# Patient Record
Sex: MALE | Race: WHITE | Employment: PART TIME | URBAN - METROPOLITAN AREA
[De-identification: names, ages, dates, MRNs, and addresses within clinical notes are randomized per-mention and may not be internally consistent; named-entity substitution may affect disease eponyms.]

---

## 2019-04-30 ENCOUNTER — HOSPITAL ENCOUNTER (OUTPATIENT)
Dept: GENERAL RADIOLOGY | Age: 64
Discharge: HOME | End: 2019-04-30
Payer: MEDICARE

## 2019-04-30 DIAGNOSIS — M54.9 BACK PAIN WITH RADIATION: ICD-10-CM

## 2019-04-30 DIAGNOSIS — M79.10 MYALGIA: ICD-10-CM

## 2019-04-30 PROCEDURE — 72110 X-RAY EXAM L-2 SPINE 4/>VWS: CPT

## 2019-04-30 PROCEDURE — 72100 X-RAY EXAM L-S SPINE 2/3 VWS: CPT

## 2019-05-09 ENCOUNTER — HOSPITAL ENCOUNTER (OUTPATIENT)
Dept: GENERAL RADIOLOGY | Age: 64
Discharge: HOME | End: 2019-05-09
Attending: INTERNAL MEDICINE
Payer: MEDICARE
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
## 2019-05-09 DIAGNOSIS — M54.2 NECK PAIN: ICD-10-CM

## 2019-05-09 PROCEDURE — 72050 X-RAY EXAM NECK SPINE 4/5VWS: CPT

## 2019-09-17 ENCOUNTER — HOSPITAL ENCOUNTER (OUTPATIENT)
Dept: MRI IMAGING | Age: 64
Discharge: HOME | End: 2019-09-17
Attending: ORTHOPAEDIC SURGERY
Payer: MEDICARE

## 2019-09-17 DIAGNOSIS — M54.9 BACK PAIN: ICD-10-CM

## 2019-09-17 PROCEDURE — 72148 MRI LUMBAR SPINE W/O DYE: CPT

## 2020-11-11 ENCOUNTER — HOSPITAL ENCOUNTER (EMERGENCY)
Age: 65
Discharge: STILL ADMITTED OR ADMINISTRATIVE | End: 2020-11-11

## 2020-11-11 ENCOUNTER — TRANSCRIBE ORDER (OUTPATIENT)
Dept: SCHEDULING | Age: 65
End: 2020-11-11

## 2020-11-11 DIAGNOSIS — M89.8X6 TIBIAL PAIN: Primary | ICD-10-CM

## 2020-11-12 ENCOUNTER — HOSPITAL ENCOUNTER (OUTPATIENT)
Dept: MRI IMAGING | Age: 65
Discharge: HOME | End: 2020-11-12
Attending: STUDENT IN AN ORGANIZED HEALTH CARE EDUCATION/TRAINING PROGRAM
Payer: MEDICARE

## 2020-11-12 ENCOUNTER — TRANSCRIBE ORDER (OUTPATIENT)
Dept: SCHEDULING | Age: 65
End: 2020-11-12

## 2020-11-12 DIAGNOSIS — M89.8X6 PAIN IN TIBIA: ICD-10-CM

## 2020-11-12 DIAGNOSIS — M89.8X6 PAIN IN TIBIA: Primary | ICD-10-CM

## 2020-11-12 PROCEDURE — 73718 MRI LOWER EXTREMITY W/O DYE: CPT

## 2021-10-13 ENCOUNTER — TRANSCRIBE ORDER (OUTPATIENT)
Dept: SCHEDULING | Age: 66
End: 2021-10-13

## 2021-10-13 DIAGNOSIS — M51.36 DEGENERATION OF LUMBAR INTERVERTEBRAL DISC: Primary | ICD-10-CM

## 2021-10-13 DIAGNOSIS — M54.16 LUMBAR RADICULOPATHY: ICD-10-CM

## 2021-11-02 ENCOUNTER — HOSPITAL ENCOUNTER (OUTPATIENT)
Dept: MRI IMAGING | Age: 66
Discharge: HOME | End: 2021-11-02
Attending: ORTHOPAEDIC SURGERY
Payer: MEDICARE

## 2021-11-02 DIAGNOSIS — M54.16 LUMBAR RADICULOPATHY: ICD-10-CM

## 2021-11-02 DIAGNOSIS — M51.36 DEGENERATION OF LUMBAR INTERVERTEBRAL DISC: ICD-10-CM

## 2021-11-02 PROCEDURE — 72148 MRI LUMBAR SPINE W/O DYE: CPT

## 2021-11-24 ENCOUNTER — TELEPHONE (OUTPATIENT)
Dept: NEUROLOGY | Age: 66
End: 2021-11-24

## 2021-11-24 ENCOUNTER — OFFICE VISIT (OUTPATIENT)
Dept: NEUROLOGY | Age: 66
End: 2021-11-24
Payer: MEDICARE

## 2021-11-24 VITALS
HEART RATE: 92 BPM | WEIGHT: 194.5 LBS | OXYGEN SATURATION: 97 % | BODY MASS INDEX: 28.81 KG/M2 | SYSTOLIC BLOOD PRESSURE: 132 MMHG | DIASTOLIC BLOOD PRESSURE: 86 MMHG | HEIGHT: 69 IN

## 2021-11-24 DIAGNOSIS — R41.3 MEMORY IMPAIRMENT: Primary | ICD-10-CM

## 2021-11-24 DIAGNOSIS — G30.9 ALZHEIMER'S DISEASE, UNSPECIFIED (CODE) (HCC): ICD-10-CM

## 2021-11-24 PROCEDURE — G8536 NO DOC ELDER MAL SCRN: HCPCS | Performed by: PSYCHIATRY & NEUROLOGY

## 2021-11-24 PROCEDURE — 1101F PT FALLS ASSESS-DOCD LE1/YR: CPT | Performed by: PSYCHIATRY & NEUROLOGY

## 2021-11-24 PROCEDURE — G8754 DIAS BP LESS 90: HCPCS | Performed by: PSYCHIATRY & NEUROLOGY

## 2021-11-24 PROCEDURE — G8752 SYS BP LESS 140: HCPCS | Performed by: PSYCHIATRY & NEUROLOGY

## 2021-11-24 PROCEDURE — G8419 CALC BMI OUT NRM PARAM NOF/U: HCPCS | Performed by: PSYCHIATRY & NEUROLOGY

## 2021-11-24 PROCEDURE — 3017F COLORECTAL CA SCREEN DOC REV: CPT | Performed by: PSYCHIATRY & NEUROLOGY

## 2021-11-24 PROCEDURE — 99204 OFFICE O/P NEW MOD 45 MIN: CPT | Performed by: PSYCHIATRY & NEUROLOGY

## 2021-11-24 PROCEDURE — G9717 DOC PT DX DEP/BP F/U NT REQ: HCPCS | Performed by: PSYCHIATRY & NEUROLOGY

## 2021-11-24 PROCEDURE — G8427 DOCREV CUR MEDS BY ELIG CLIN: HCPCS | Performed by: PSYCHIATRY & NEUROLOGY

## 2021-11-24 RX ORDER — LYSINE HCL 500 MG
1000 TABLET ORAL
COMMUNITY

## 2021-11-24 RX ORDER — LANOLIN ALCOHOL/MO/W.PET/CERES
1000 CREAM (GRAM) TOPICAL
COMMUNITY

## 2021-11-24 RX ORDER — METHOCARBAMOL 750 MG/1
750 TABLET, FILM COATED ORAL
COMMUNITY

## 2021-11-24 RX ORDER — AMLODIPINE BESYLATE 2.5 MG/1
5 TABLET ORAL
COMMUNITY
End: 2021-12-07

## 2021-11-24 RX ORDER — DONEPEZIL HYDROCHLORIDE 10 MG/1
10 TABLET, FILM COATED ORAL
Qty: 60 TABLET | Refills: 3 | Status: SHIPPED | OUTPATIENT
Start: 2021-11-24 | End: 2021-12-24

## 2021-11-24 RX ORDER — GABAPENTIN 100 MG/1
CAPSULE ORAL
COMMUNITY
Start: 2021-11-17

## 2021-11-24 RX ORDER — DIAZEPAM 5 MG/1
TABLET ORAL
COMMUNITY
Start: 2021-10-22 | End: 2021-11-24

## 2021-11-24 RX ORDER — HYDROMORPHONE HYDROCHLORIDE 2 MG/1
2 TABLET ORAL
COMMUNITY
End: 2021-12-07

## 2021-11-24 RX ORDER — MEMANTINE HYDROCHLORIDE 5 MG/1
5 TABLET ORAL 2 TIMES DAILY
Qty: 60 TABLET | Refills: 3 | Status: SHIPPED | OUTPATIENT
Start: 2021-11-24 | End: 2022-03-23

## 2021-11-24 RX ORDER — DONEPEZIL HYDROCHLORIDE 5 MG/1
TABLET, FILM COATED ORAL
COMMUNITY
Start: 2021-11-15 | End: 2021-11-24 | Stop reason: SDUPTHER

## 2021-11-24 RX ORDER — ALBUTEROL SULFATE 90 UG/1
AEROSOL, METERED RESPIRATORY (INHALATION)
COMMUNITY
Start: 2021-07-01

## 2021-11-24 NOTE — PROGRESS NOTES
Chief Complaint   Patient presents with    Neurologic Problem     \" I have brain atrophy and I have been having memory loss that is getting worse. \"     Visit Vitals  /86 (BP 1 Location: Right upper arm, BP Patient Position: Sitting)   Pulse 92   Ht 5' 9\" (1.753 m)   Wt 194 lb 8 oz (88.2 kg)   SpO2 97%   BMI 28.72 kg/m²

## 2021-11-24 NOTE — TELEPHONE ENCOUNTER
----- Message from April Rabb sent at 11/24/2021  2:22 PM EST -----  Regarding: /Telephone  General Message/Vendor Calls    Caller's first and last name: N/A      Reason for call: Questions      Callback required yes/no and why: Yes      Best contact number(s): [phone]      Details to clarify the request: Pt has some questions from his appt       April Rabb

## 2021-11-24 NOTE — PROGRESS NOTES
[facility]   NEW PATIENT EVALUATION/CONSULTATION       PATIENT NAME: Chris Parham    MRN: [id number]    REASON FOR CONSULTATION: Progressive memory impairment, abnormal head CT    11/24/21    HISTORY OF PRESENT ILLNESS:  Chris Parham is a 74y.o.-year-old right-hand-dominant male who presents to [facility] as referral from primary care for progressive memory impairment. Patient is a poor historian though mentions that over the past several years memory has declined. States that his short-term memory is garbage is able to remember more long-term events. Memory impairment is manifest by not be able to remember events, conversations. Is reported going to the wrong office for various appointments. Does use tools such as writing things down to help his memory. Does not endorse much in the way of visual spatial difficulties no motoric changes, does not endorse hallucinations. Does not endorse any other neurologic complaints. States that he is to tell his previous primary care about this which was ignored. More recently he was seen at home for a wellness exam at which point he was noted to have concern for MCI/dementia which he told his primary care about. Primary placed him on donezepil as well as ordered CT which demonstrated mild diffuse atrophy. He does not note any tremor or anything else of note regarding his memory complaint. 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 Says that he eats well does exercise 3 times a week does note that sleep is impaired. He also wonders about intrusive thoughts which appear mostly related to sexual fantasies. PAST MEDICAL HISTORY:  Past Medical History:   Diagnosis Date    Allergic rhinitis     RENEE positive 2004    1:160 speckled.     Angioedema of lips     childhood -unclear trigger     Asthma     CTS (carpal tunnel syndrome)     CTS (carpal tunnel syndrome) 2004    emg, dr. Mckayla Nieves'    Degenerative cervical disc     Depression     DeQuervain's disease (tenosynovitis)     left    Fibromyalgia     Fibromyalgia     Insomnia 2/15/2013    Post herpetic neuralgia     Rotator cuff tendinitis     left    Seborrheic dermatitis     Thoracic outlet syndrome     Thromboembolus ([address]     right arm        PAST SURGICAL HISTORY:  Past Surgical History:   Procedure Laterality Date    HX HERNIA REPAIR  2010    right    HX OTHER SURGICAL      first right rib resection       FAMILY HISTORY:   Family History   Problem Relation Age of Onset    Diabetes Mother     Arthritis-osteo Mother     Hypertension Mother     Heart Disease Mother         MI    Hypertension Father     Heart Disease Father         MI    Hypertension Sister     Heart Disease Sister     Hypertension Brother          SOCIAL HISTORY:  Social History     Socioeconomic History    Marital status:    Tobacco Use    Smoking status: Former Smoker    Smokeless tobacco: Never Used   Vaping Use    Vaping Use: Never used   Substance and Sexual Activity    Alcohol use: Yes     Comment: rare     Drug use: No         MEDICATIONS:   Current Outpatient Medications   Medication Sig Dispense Refill    methocarbamoL (ROBAXIN) 750 mg tablet 750 mg.      gabapentin (NEURONTIN) 100 mg capsule       cyanocobalamin 1,000 mcg tablet 1,000 mcg.  Calcium Carbonate-Vit D3-Min 600 mg calcium- 400 unit tab 1,000 mg.      amLODIPine (NORVASC) 2.5 mg tablet 5 mg.  albuterol (Ventolin HFA) 90 mcg/actuation inhaler       donepeziL (ARICEPT) 10 mg tablet Take 1 Tablet by mouth nightly for 30 days. 60 Tablet 3    memantine (NAMENDA) 5 mg tablet Take 1 Tablet by mouth two (2) times a day for 30 days.  60 Tablet 3    meloxicam (MOBIC) 7.5 mg tablet TAKE TWO TABLETS BY MOUTH DAILY 180 Tablet 0    irbesartan (AVAPRO) 150 mg tablet TAKE ONE TABLET BY MOUTH DAILY 90 Tablet 0    montelukast (SINGULAIR) 10 mg tablet TAKE ONE TABLET BY MOUTH DAILY 90 Tablet 2    traZODone (DESYREL) 50 mg tablet TAKE TWO TABLETS BY MOUTH EVERY NIGHT AT BEDTIME 60 Tab 5    LORazepam (ATIVAN) 0.5 mg tablet TAKE 1 TABLET BY MOUTH EVERY DAY AS NEEDED FOR ANXITY  Indications: anxious 10 Tab 0    naloxone (NARCAN) 4 mg/actuation nasal spray Use 1 spray intranasally, then discard. Repeat with new spray every 2 min as needed for opioid overdose symptoms, alternating nostrils. 1 Each 5    OTHER Fluroracil ceam 5% bid to face. Also Sildenafil 20 mg , up to 5 tabs prior to activity .  levalbuterol tartrate (XOPENEX) 45 mcg/actuation inhaler INHALE 2 PUFFS EVERY 4 TO 6 HOURS AS NEEDED FOR COUGH AND WHEEZE 15 Inhaler 3    OTHER Stress Tab, Calcium /D, Magnesium , b12  Daily      alfuzosin SR (UROXATRAL) 10 mg SR tablet Take  by mouth daily.  cetirizine (ZYRTEC) 10 mg tablet Take 10 mg by mouth nightly.  HYDROmorphone (DILAUDID) 2 mg tablet 2 mg. (Patient not taking: Reported on 11/24/2021)      cyclobenzaprine (FLEXERIL) 10 mg tablet Take 1 Tab by mouth three (3) times daily as needed for Muscle Spasm(s). (Patient not taking: Reported on 11/24/2021) 30 Tab 1         ALLERGIES:  Allergies   Allergen Reactions    Latex Unknown (comments)    Other Food Other (comments)     Pork -skin test + Wheat - skin test +itching. Corn -unclear reaction   Peanut - skin test +  But eats peanuts in moderation    Albuterol Other (comments)     Palpitations, insomnia . (takes Xopenex Ok )   [employer] Unknown (comments)    Hydrocodone [Hydrocodone] Nausea Only    Pcn [Penicillins] Unknown (comments)    Sulfa (Sulfonamide Antibiotics) Diarrhea         REVIEW OF SYSTEMS:  10 point ROS reviewed with patient. Please see scanned document under media.        PHYSICAL EXAM:  Vital Signs:   Visit Vitals  /86 (BP 1 Location: Right upper arm, BP Patient Position: Sitting)   Pulse 92   Ht 5' 9\" (1.753 m)   Wt 194 lb 8 oz (88.2 kg)   SpO2 97%   BMI 28.72 kg/m²     Unique dividual resting comfortably in bed appearing a bit disheveled. HEENT appears grossly unremarkable neck appears supple. Cardiovascular demonstrates constant S1/S2 regular rhythm. Pulmonary demonstrates equal entry bilaterally. Abdomen is nondistended/nontender. Extremities are warm/dry. Neurologically, patient appears oriented to self, day month and year. Oriented to situation. Attention is impaired as measured on [place]. Speech is clear, language is fluent, naming is reasonably intact to simple objects, repetition is intact. Verbal fluency is intact. Scores 19 out of 30 on [place] with deficiencies as noted below. Cranials 2 through 12 appear grossly unremarkable. Motorically patient has normal bulk and tone, full strength in upper and lower extremities. Sensation is grossly intact. Coordination is intact in upper extremities. No dysmetria is noted, no tremors noted at rest with posture or intention. Primary gait and station appears unremarkable. PERTINENT DATA:  None    CT Results (maximum last 3): No results found for this or any previous visit. MRI Results (maximum last 3): Results from [place] encounter on 11/02/21    MRI LUMB SPINE WO CONT    Narrative  EXAM: MRI LUMB SPINE WO CONT    INDICATION: . Other intervertebral disc degeneration, lumbar region    COMPARISON: MR lumbar spine September 17, 2019    TECHNIQUE: MR imaging of the lumbar spine was performed using the following  sequences: sagittal T1, T2, STIR;  axial T1, T2.    CONTRAST:  None. FINDINGS:    There is normal alignment of the lumbar spine. Vertebral body heights are  maintained. Marrow signal is normal.    The conus medullaris terminates at L1. Signal and caliber of the distal spinal  cord are within normal limits. The paraspinal soft tissues are within normal limits. Lower thoracic spine: No herniation or stenosis.     L1-L2: Broad-based disc bulge with superimposed right paracentral/foraminal disc  protrusion which causes narrowing of the right lateral recess and impingement  upon the traversing right L2 nerve root. Mild bilateral neuroforaminal  narrowing. L2-L3: Broad-based disc bulge and mild bilateral facet arthropathy cause mild  spinal canal narrowing and mild bilateral neuroforaminal narrowing. L3-L4: Broad-based disc bulge, and moderate bilateral facet arthropathy with  mild thickening of ligamentum flavum causes mild spinal canal narrowing and  moderate bilateral neuroforaminal narrowing. L4-L5: Broad-based disc bulge with superimposed central disc protrusion, with  moderate bilateral facet arthropathy, causes mild spinal canal narrowing and  severe bilateral neuroforaminal narrowing. L5-S1: Broad-based disc bulge and moderate bilateral facet arthropathy cause  severe bilateral neuroforaminal narrowing. No significant spinal canal  narrowing. Impression  1. Severe bilateral neuroforaminal narrowing at L4-5 and L5-S1. Multiple levels  of mild to moderate neuroforaminal narrowing as detailed above. 2. Mild spinal canal narrowing at L2-3, L3-4, and L4-5.  3. Right paracentral/foraminal disc protrusion at L1-2 cause narrowing of the  right lateral recess and impingement upon the traversing right L2 nerve root. Results from [place] encounter on 11/12/20    MRI TIB/FIB RT WO CONT    Narrative  EXAM:  MRI TIB/FIB RT WO CONT    INDICATION:  Evaluate for stress fracture. COMPARISON: None    TECHNIQUE: Axial, coronal, and sagittal MRI of the right tibia/fibula in the T1,  T2, and inversion-recovery pulse sequences with and without fat saturation . CONTRAST: None. FINDINGS: Bone marrow: No intramedullary edema. No evidence of a fracture line. No evidence of a focal lesion. Tendons: Intact. Muscles: Within normal limits. Neurovascular bundles: Within normal limits. Soft tissue mass: No mass.  There is a nonspecific small focus of  subcutaneous/periosteal edema anterior medial to the mid tibia, best seen on  axial series 9 and 1 image 26. .    Impression  IMPRESSION:  Nonspecific small focus of subcutaneous/periosteal edema anterior medial to the  mid tibia. No evidence of stress fracture or intramedullary bone marrow edema. Results from [place] encounter on 09/17/19    MRI LUMB SPINE WO CONT    Narrative  EXAM:  MRI LUMB SPINE WO CONT    INDICATION:   BACK PAIN    COMPARISON: Lumbar spine radiograph 4/30/2019. TECHNIQUE: Multiplanar multisequence acquisition without contrast of the lumbar  spine. The study was performed on an open configuration low field strength MR  imaging system. CONTRAST: None. FINDINGS:  The last well-formed disk is designated as L5-S1 for the purpose of this report. Vertebral bodies were numbered using this convention. There is normal alignment of the lumbar spine. Vertebral body heights are  maintained without evidence of acute fracture. Marrow signal is normal.  Multilevel degenerative disc disease as detailed below. The conus is normal in  size and signal and terminates at L1. The cauda equina is unremarkable. Visualized soft tissues are unremarkable. T12-L1: No significant disc herniation, spinal canal or neural foraminal  stenosis. L1-L2: Diffuse disc bulge with superimposed right paracentral disc protrusion,  which mildly narrows the right lateral recess. There is overall mild spinal  canal stenosis. Mild right and no left neural foraminal stenosis. L2-L3: Diffuse disc bulge. Mild spinal canal stenosis. Mild bilateral neural  foraminal stenosis. L3-L4: Diffuse disc bulge. Mild to moderate spinal canal stenosis. Mild to  moderate bilateral neural foraminal stenosis. L4-L5: Diffuse disc bulge. Mild bilateral facet arthropathy. Moderate to severe  spinal canal stenosis. Severe bilateral neural foraminal stenosis. L5-S1: Mild diffuse disc bulge. No significant spinal canal stenosis.  Severe  right and moderate left neural foraminal stenosis. Impression  IMPRESSION:  1. Moderate to severe spinal canal stenosis and severe bilateral neural  foraminal stenosis at L4-L5. 2. Severe right and moderate left neural foraminal stenosis at L5-S1.  3. Mild to moderate spinal canal stenosis at L3-L4. Remaining degenerative  changes as detailed above. ASSESSMENT:      Coco iRchardson is a 58-year-old right-hand-dominant male who presents to [facility] neurology clinic for evaluation of progressive memory decline concerning for Alzheimer's type dementia    PLAN:  Patient scored a 19 out of 30 on bedside MoCA exam with deficiencies in executive function/visuospatial, attention, registry and recall  Given patient's tangential and somewhat rambling nature unlikely that patient would reasonably able to tolerate neuropsych testing  Has already had head CT which demonstrates diffuse atrophy to a mild degree without particular pattern no need for MRI as no tumor, significant white matter burden was noted  Patient is already on donezepil 5 mg nightly will increase to 10 mg and add memantine 5 mg twice daily  Discuss cognitive/physical exercise, social engagement, proper diet and sleep as measures to further address memory decline  Encourage patient to consider having his advanced directive/living will in order  Did not offer, the patient's intrusive thoughts including sexual fantasies    Follow-up in 3 months    Greater than 45 minutes were spent present by me during this visit of which greater than face time was engaged in counseling and coordination of care.       Abdullahi Aguirre MD       CC Referring provider:  Jennyfer Valdez MD

## 2021-11-24 NOTE — TELEPHONE ENCOUNTER
----- Message from Jefry Arias sent at 11/24/2021  3:59 PM EST -----  Regarding: \telephone  Contact: [phone]  General Message/Vendor Calls    Caller's first and last name:self      Reason for call:have some question       Callback required yes/no and why:y      Best contact number(s):[phone] or [phone]      Details to clarify the request:n\a      Jefry Arias

## 2021-11-25 LAB
TSH SERPL DL<=0.005 MIU/L-ACNC: 1.61 UIU/ML (ref 0.45–4.5)
VIT B12 SERPL-MCNC: 933 PG/ML (ref 232–1245)

## 2021-11-26 NOTE — TELEPHONE ENCOUNTER
Patient calling with questions from last appt:    1) Stage of dementia that he is in and how long he has had it  2) Any tests that need to be done? 3) Will this affect his driving or working? 4) questions re coconut oil supplement? 5) can he still read because that makes him tired. Patient also stated he has let things go in the house and he is wondering if there is someone that can help him. For example he said his coats are piled up almost as tall as he is.      He also stated there is a question too personal and would rather discuss with Dr. Tony Doll

## 2021-11-30 NOTE — TELEPHONE ENCOUNTER
----- Message from Green Button sent at 11/30/2021  2:26 PM EST -----  Regarding: Dr. Rodriguez/Telephone  General Message/Vendor Calls    Caller's first and last name:      Reason for call: The patient is very upset and needs to talk to Dr. Pippa De La O.      Callback required yes/no and why: Yes The patients has questions and concerns      Best contact number(s): [phone]      Details to clarify the request: The patient is very upset and needs to talk to Dr. Tommie Meraz. Patient has questions and concerns about the meds. Hes hearing noises. Call him Please.            Green Button

## 2021-12-01 NOTE — TELEPHONE ENCOUNTER
Patient is calling stating he is having leg cramps that he thinks is because of the medication. He is requesting a call back. Patient is scheduled for a follow up on 12/7. Please call to discuss side effects      Patient said he has never had this type of neglect from a doctor before.  He doesn't understand why no one has called him back

## 2021-12-07 ENCOUNTER — OFFICE VISIT (OUTPATIENT)
Dept: NEUROLOGY | Age: 66
End: 2021-12-07
Payer: MEDICARE

## 2021-12-07 VITALS
DIASTOLIC BLOOD PRESSURE: 80 MMHG | BODY MASS INDEX: 28.09 KG/M2 | WEIGHT: 196.2 LBS | HEART RATE: 101 BPM | HEIGHT: 70 IN | OXYGEN SATURATION: 96 % | SYSTOLIC BLOOD PRESSURE: 130 MMHG

## 2021-12-07 DIAGNOSIS — R40.4 NONSPECIFIC PAROXYSMAL SPELL: Primary | ICD-10-CM

## 2021-12-07 DIAGNOSIS — G30.9 ALZHEIMER'S DISEASE, UNSPECIFIED (CODE) (HCC): ICD-10-CM

## 2021-12-07 PROCEDURE — 99215 OFFICE O/P EST HI 40 MIN: CPT | Performed by: PSYCHIATRY & NEUROLOGY

## 2021-12-07 PROCEDURE — G8536 NO DOC ELDER MAL SCRN: HCPCS | Performed by: PSYCHIATRY & NEUROLOGY

## 2021-12-07 PROCEDURE — G8754 DIAS BP LESS 90: HCPCS | Performed by: PSYCHIATRY & NEUROLOGY

## 2021-12-07 PROCEDURE — G8427 DOCREV CUR MEDS BY ELIG CLIN: HCPCS | Performed by: PSYCHIATRY & NEUROLOGY

## 2021-12-07 PROCEDURE — 1101F PT FALLS ASSESS-DOCD LE1/YR: CPT | Performed by: PSYCHIATRY & NEUROLOGY

## 2021-12-07 PROCEDURE — G8752 SYS BP LESS 140: HCPCS | Performed by: PSYCHIATRY & NEUROLOGY

## 2021-12-07 PROCEDURE — G8419 CALC BMI OUT NRM PARAM NOF/U: HCPCS | Performed by: PSYCHIATRY & NEUROLOGY

## 2021-12-07 PROCEDURE — 3017F COLORECTAL CA SCREEN DOC REV: CPT | Performed by: PSYCHIATRY & NEUROLOGY

## 2021-12-07 PROCEDURE — G9717 DOC PT DX DEP/BP F/U NT REQ: HCPCS | Performed by: PSYCHIATRY & NEUROLOGY

## 2021-12-07 NOTE — PROGRESS NOTES
Neurology Clinic Follow up Note    Patient ID:  Dean Proctor  [id number]  78 y.o.  1955      Mr. Mariam Serna is here for follow up today of  Chief Complaint   Patient presents with    Follow-up    Neurologic Problem          Last Appointment With Me:  11/24/2021       Interval History: In the interval from prior evaluation, patient has noted leg cramps in his legs since increasing donezepil otherwise no major issues are noted. Is here today to answer a myriad of questions. PMHx/ PSHx/ FHx/ SHx:  Reviewed and unchanged previous visit. ROS:  Comprehensive review of systems negative except for as noted above. Objective:       Meds:  Current Outpatient Medications   Medication Sig Dispense Refill    gabapentin (NEURONTIN) 100 mg capsule       Calcium Carbonate-Vit D3-Min 600 mg calcium- 400 unit tab 1,000 mg.      albuterol (Ventolin HFA) 90 mcg/actuation inhaler       donepeziL (ARICEPT) 10 mg tablet Take 1 Tablet by mouth nightly for 30 days. 60 Tablet 3    memantine (NAMENDA) 5 mg tablet Take 1 Tablet by mouth two (2) times a day for 30 days. 60 Tablet 3    meloxicam (MOBIC) 7.5 mg tablet TAKE TWO TABLETS BY MOUTH DAILY 180 Tablet 0    irbesartan (AVAPRO) 150 mg tablet TAKE ONE TABLET BY MOUTH DAILY 90 Tablet 0    montelukast (SINGULAIR) 10 mg tablet TAKE ONE TABLET BY MOUTH DAILY 90 Tablet 2    levalbuterol tartrate (XOPENEX) 45 mcg/actuation inhaler INHALE 2 PUFFS EVERY 4 TO 6 HOURS AS NEEDED FOR COUGH AND WHEEZE 15 Inhaler 3    OTHER Stress Tab, Calcium /D, Magnesium , b12  Daily      alfuzosin SR (UROXATRAL) 10 mg SR tablet Take  by mouth daily.  cyclobenzaprine (FLEXERIL) 10 mg tablet Take 1 Tab by mouth three (3) times daily as needed for Muscle Spasm(s). 30 Tab 1    methocarbamoL (ROBAXIN) 750 mg tablet 750 mg.      cyanocobalamin 1,000 mcg tablet 1,000 mcg.          Exam:  Visit Vitals  /80   Pulse (!) 101   Ht 5' 10\" (1.778 m)   Wt 196 lb 3.2 oz (89 kg)   SpO2 96% BMI 28.15 kg/m²     Elderly male appearing stated age appearing somewhat hyper animated and tangential in his thought processes. HEENT appears grossly intact observation. Neck appears supple to observation. Extremities are warm/dry. Neurologically patient appears oriented to self and situation, orientation to day month year not tested. Speech is clear language fluent with deficiencies in attention and smooths string of consciousness clearly absent. Cranial nerves II through XII appear grossly unremarkable. Motorically patient moves all extremities with equal strength. Remainder examination is deferred. LABS  Results for orders placed or performed in visit on 11/24/21   TSH 3RD GENERATION   Result Value Ref Range    TSH 1.610 0.450 - 4.500 uIU/mL   VITAMIN B12   Result Value Ref Range    Vitamin B12 933 232 - 1,245 pg/mL       IMAGING:  MRI Results (most recent):  Results from [place] encounter on 11/02/21    MRI LUMB SPINE WO CONT    Narrative  EXAM: MRI LUMB SPINE WO CONT    INDICATION: . Other intervertebral disc degeneration, lumbar region    COMPARISON: MR lumbar spine September 17, 2019    TECHNIQUE: MR imaging of the lumbar spine was performed using the following  sequences: sagittal T1, T2, STIR;  axial T1, T2.    CONTRAST:  None. FINDINGS:    There is normal alignment of the lumbar spine. Vertebral body heights are  maintained. Marrow signal is normal.    The conus medullaris terminates at L1. Signal and caliber of the distal spinal  cord are within normal limits. The paraspinal soft tissues are within normal limits. Lower thoracic spine: No herniation or stenosis. L1-L2: Broad-based disc bulge with superimposed right paracentral/foraminal disc  protrusion which causes narrowing of the right lateral recess and impingement  upon the traversing right L2 nerve root. Mild bilateral neuroforaminal  narrowing.     L2-L3: Broad-based disc bulge and mild bilateral facet arthropathy cause mild  spinal canal narrowing and mild bilateral neuroforaminal narrowing. L3-L4: Broad-based disc bulge, and moderate bilateral facet arthropathy with  mild thickening of ligamentum flavum causes mild spinal canal narrowing and  moderate bilateral neuroforaminal narrowing. L4-L5: Broad-based disc bulge with superimposed central disc protrusion, with  moderate bilateral facet arthropathy, causes mild spinal canal narrowing and  severe bilateral neuroforaminal narrowing. L5-S1: Broad-based disc bulge and moderate bilateral facet arthropathy cause  severe bilateral neuroforaminal narrowing. No significant spinal canal  narrowing. Impression  1. Severe bilateral neuroforaminal narrowing at L4-5 and L5-S1. Multiple levels  of mild to moderate neuroforaminal narrowing as detailed above. 2. Mild spinal canal narrowing at L2-3, L3-4, and L4-5.  3. Right paracentral/foraminal disc protrusion at L1-2 cause narrowing of the  right lateral recess and impingement upon the traversing right L2 nerve root.           Assessment:     Chayo Thomas is a 58-year-old right-hand-dominant male presents to [facility] neurology clinic for urgent follow-up to discuss myriad of questions in the setting of recent diagnosis suspected Alzheimer's type dementia        Plan:   Alzheimer's dementia:   Based on clinical gestalt, performance on recent bedside MoCA with patient unable to recently perform in formal cognitive testing given tangential nature and lack of attention  Patient at last visit was placed on increased dose of Aricept (10 mg) as he was already on 5 mg and started memantine, complaining of leg cramps now  Recommended reducing back to 5 mg and if cramps resolved to continue 5 mg  We will continue memantine unchanged for the time being  Patient has a myriad of questions many of which really require personal decisions does not get lost regularly with driving would not restrict but did recommend driving only short distances, has not been making mistakes at work would recommend self evaluation  We will obtain EEG as patient is prone to episodes of altered awareness/lapse in attention though this may behavioral/secondary to underlying dementia  Otherwise we will continue on donezepil and memantine for now  We will asked nursing to reach out to Alzheimer's associated  to discuss potential resources    Posterior head pain with ejaculation:  Suspect secondary to cervical arthritis as patient has notable arthritis in lower back and based on complaints suspect Valsalva during ejaculation leads to transient nerve root irritation    Follow-up according to previously arranged schedule    Greater than 45 minutes were spent present by me during this visit including review of data, interviewing and counseling patient as well as with documentation and medication reconciliation in a 24-hour period    Signed:  Leydi Medrano MD  12/7/2021  12:34 PM English

## 2021-12-14 ENCOUNTER — TELEPHONE (OUTPATIENT)
Dept: NEUROLOGY | Age: 66
End: 2021-12-14

## 2021-12-14 NOTE — TELEPHONE ENCOUNTER
When patient was last seen, he stated that he and the Dr spoke about the possibility of getting some \"help at home\" for various issues related to daily life. He is checking on the status of this. Patient states he is still driving and has had public outbursts that almost got the police called.       He lives with his partner Trena Mejia, who works the night shift at a local hospital.

## 2021-12-15 NOTE — TELEPHONE ENCOUNTER
Spoke with patient, informed him per -  Can we have the Alzheimer's  reach out to him to discuss possible resources. Otherwise not ordering home health to help with his laundry piles. Also, we can refer to psychiatry if he is interested and if he cannot handle driving then he should stop driving . .. He verbalized understanding. He would like someone from the [employer] to reach out to him.

## 2021-12-21 ENCOUNTER — HOSPITAL ENCOUNTER (OUTPATIENT)
Dept: NEUROLOGY | Age: 66
Discharge: HOME | End: 2021-12-21
Attending: PSYCHIATRY & NEUROLOGY
Payer: MEDICARE

## 2021-12-21 DIAGNOSIS — R40.4 NONSPECIFIC PAROXYSMAL SPELL: ICD-10-CM

## 2021-12-21 PROCEDURE — 95816 EEG AWAKE AND DROWSY: CPT

## 2021-12-21 PROCEDURE — 95819 EEG AWAKE AND ASLEEP: CPT | Performed by: PSYCHIATRY & NEUROLOGY

## 2021-12-21 NOTE — PROCEDURES
ELECTROENCEPHALOGRAM REPORT     Patient Name: Julian Farias  : 1955  Age: 74 y.o. Ordering physician: Vic Cabezas MD   Date of EEG: 2021  Interpreting physician: Vic Cabezas MD    PROCEDURE: Routine awake, drowsy and asleep video electroencephalogram (vEEG). CLINICAL INDICATION: The patient is a 74 y.o. male who is being evaluated for       DESCRIPTION OF THE RECORD:   During wakefulness, there is well-formed posterior dominant alpha rhythm composed of 9 Hz to 10 Hz alpha with preserved anterior/posterior frequency and amplitude gradient symmetrically. Transition to drowsiness manifest by fragmentation of the waking background progressive increase in diffuse theta. N2 sleep is demarcated by emergence of sleep spindles. No epileptiform discharges or electrographic seizures are noted. Reactivity is present to environmental stimuli, photic driving is absent. Single-lead ECG demonstrates normal sinus rhythm. INTERPRETATION:     This is a normal awake, drowsy and asleep routine vEEG.       Daniel Holcomb MD

## 2021-12-24 ENCOUNTER — TELEPHONE (OUTPATIENT)
Dept: NEUROLOGY | Age: 66
End: 2021-12-24

## 2022-01-10 DIAGNOSIS — M54.16 LUMBAR RADICULOPATHY: Primary | ICD-10-CM

## 2022-03-14 ENCOUNTER — HOSPITAL ENCOUNTER (OUTPATIENT)
Dept: PREADMISSION TESTING | Age: 67
Discharge: HOME | End: 2022-03-14
Attending: SPECIALIST
Payer: MEDICARE

## 2022-03-14 ENCOUNTER — TRANSCRIBE ORDER (OUTPATIENT)
Dept: REGISTRATION | Age: 67
End: 2022-03-14

## 2022-03-14 DIAGNOSIS — U07.1 COVID-19: Primary | ICD-10-CM

## 2022-03-14 DIAGNOSIS — U07.1 COVID-19: ICD-10-CM

## 2022-03-14 PROCEDURE — U0005 INFEC AGEN DETEC AMPLI PROBE: HCPCS

## 2022-03-15 LAB
SARS-COV-2, XPLCVT: NOT DETECTED
SOURCE, COVRS: NORMAL

## 2022-03-18 ENCOUNTER — ANESTHESIA EVENT (OUTPATIENT)
Dept: ENDOSCOPY | Age: 67
End: 2022-03-18
Payer: MEDICARE

## 2022-03-18 ENCOUNTER — HOSPITAL ENCOUNTER (OUTPATIENT)
Age: 67
Setting detail: OUTPATIENT SURGERY
Discharge: HOME | End: 2022-03-18
Attending: SPECIALIST | Admitting: SPECIALIST
Payer: MEDICARE

## 2022-03-18 ENCOUNTER — ANESTHESIA (OUTPATIENT)
Dept: ENDOSCOPY | Age: 67
End: 2022-03-18
Payer: MEDICARE

## 2022-03-18 VITALS
DIASTOLIC BLOOD PRESSURE: 80 MMHG | BODY MASS INDEX: 27.77 KG/M2 | HEART RATE: 56 BPM | RESPIRATION RATE: 17 BRPM | SYSTOLIC BLOOD PRESSURE: 137 MMHG | OXYGEN SATURATION: 95 % | TEMPERATURE: 97.8 F | HEIGHT: 70 IN | WEIGHT: 194 LBS

## 2022-03-18 PROCEDURE — 74011250637 HC RX REV CODE- 250/637: Performed by: SPECIALIST

## 2022-03-18 PROCEDURE — 74011250636 HC RX REV CODE- 250/636: Performed by: NURSE ANESTHETIST, CERTIFIED REGISTERED

## 2022-03-18 PROCEDURE — 76040000019: Performed by: SPECIALIST

## 2022-03-18 PROCEDURE — 74011000250 HC RX REV CODE- 250: Performed by: NURSE ANESTHETIST, CERTIFIED REGISTERED

## 2022-03-18 PROCEDURE — 76060000031 HC ANESTHESIA FIRST 0.5 HR: Performed by: SPECIALIST

## 2022-03-18 RX ORDER — FLUMAZENIL 0.1 MG/ML
0.2 INJECTION INTRAVENOUS
Status: DISCONTINUED | OUTPATIENT
Start: 2022-03-18 | End: 2022-03-18 | Stop reason: HOSPADM

## 2022-03-18 RX ORDER — SODIUM CHLORIDE 0.9 % (FLUSH) 0.9 %
5-40 SYRINGE (ML) INJECTION EVERY 8 HOURS
Status: DISCONTINUED | OUTPATIENT
Start: 2022-03-18 | End: 2022-03-18 | Stop reason: HOSPADM

## 2022-03-18 RX ORDER — DEXTROMETHORPHAN/PSEUDOEPHED 2.5-7.5/.8
1.2 DROPS ORAL
Status: DISCONTINUED | OUTPATIENT
Start: 2022-03-18 | End: 2022-03-18 | Stop reason: HOSPADM

## 2022-03-18 RX ORDER — SODIUM CHLORIDE 9 MG/ML
INJECTION, SOLUTION INTRAVENOUS
Status: DISCONTINUED | OUTPATIENT
Start: 2022-03-18 | End: 2022-03-18 | Stop reason: HOSPADM

## 2022-03-18 RX ORDER — LIDOCAINE HYDROCHLORIDE 20 MG/ML
INJECTION, SOLUTION EPIDURAL; INFILTRATION; INTRACAUDAL; PERINEURAL AS NEEDED
Status: DISCONTINUED | OUTPATIENT
Start: 2022-03-18 | End: 2022-03-18 | Stop reason: HOSPADM

## 2022-03-18 RX ORDER — SODIUM CHLORIDE 9 MG/ML
50 INJECTION, SOLUTION INTRAVENOUS CONTINUOUS
Status: DISCONTINUED | OUTPATIENT
Start: 2022-03-18 | End: 2022-03-18 | Stop reason: HOSPADM

## 2022-03-18 RX ORDER — PROPOFOL 10 MG/ML
INJECTION, EMULSION INTRAVENOUS AS NEEDED
Status: DISCONTINUED | OUTPATIENT
Start: 2022-03-18 | End: 2022-03-18 | Stop reason: HOSPADM

## 2022-03-18 RX ORDER — NALOXONE HYDROCHLORIDE 0.4 MG/ML
0.4 INJECTION, SOLUTION INTRAMUSCULAR; INTRAVENOUS; SUBCUTANEOUS
Status: DISCONTINUED | OUTPATIENT
Start: 2022-03-18 | End: 2022-03-18 | Stop reason: HOSPADM

## 2022-03-18 RX ORDER — SODIUM CHLORIDE 0.9 % (FLUSH) 0.9 %
5-40 SYRINGE (ML) INJECTION AS NEEDED
Status: DISCONTINUED | OUTPATIENT
Start: 2022-03-18 | End: 2022-03-18 | Stop reason: HOSPADM

## 2022-03-18 RX ORDER — ATROPINE SULFATE 0.1 MG/ML
0.5 INJECTION INTRAVENOUS
Status: DISCONTINUED | OUTPATIENT
Start: 2022-03-18 | End: 2022-03-18 | Stop reason: HOSPADM

## 2022-03-18 RX ORDER — EPINEPHRINE 0.1 MG/ML
1 INJECTION INTRACARDIAC; INTRAVENOUS
Status: DISCONTINUED | OUTPATIENT
Start: 2022-03-18 | End: 2022-03-18 | Stop reason: HOSPADM

## 2022-03-18 RX ADMIN — PROPOFOL 70 MG: 10 INJECTION, EMULSION INTRAVENOUS at 15:53

## 2022-03-18 RX ADMIN — SODIUM CHLORIDE: 900 INJECTION, SOLUTION INTRAVENOUS at 15:20

## 2022-03-18 RX ADMIN — PROPOFOL 30 MG: 10 INJECTION, EMULSION INTRAVENOUS at 15:57

## 2022-03-18 RX ADMIN — PROPOFOL 20 MG: 10 INJECTION, EMULSION INTRAVENOUS at 16:01

## 2022-03-18 RX ADMIN — LIDOCAINE HYDROCHLORIDE 50 MG: 20 INJECTION, SOLUTION EPIDURAL; INFILTRATION; INTRACAUDAL; PERINEURAL at 15:53

## 2022-03-18 RX ADMIN — PROPOFOL 30 MG: 10 INJECTION, EMULSION INTRAVENOUS at 15:55

## 2022-03-18 NOTE — PERIOP NOTES

## 2022-03-18 NOTE — PROCEDURES
[address]  [facility], [address] [place] [address]                 Colonoscopy Procedure Note    Indications:   See Preoperative Diagnosis above  Referring Physician: Brett Padilla MD  Anesthesia/Sedation: MAC anesthesia Propofol  Endoscopist:  Dr. Lety Muniz  Assistant:  Endoscopy Technician-1: Jing Pratt  Endoscopy RN-1: Donna Kilpatrick RN  Preoperative diagnosis: Change in bowel habit [R19.4]  Alzheimer disease ([facility] [address] [G30.9, F02.80]  Postoperative diagnosis: 1)Diverticulosis    Procedure in Detail:  Informed consent was obtained for the procedure, including sedation. Risks of perforation, hemorrhage, adverse drug reaction, and aspiration were discussed. The patient was placed in the left lateral decubitus position. Based on the pre-procedure assessment, including review of the patient's medical history, medications, allergies, and review of systems, he had been deemed to be an appropriate candidate for  sedation; he was therefore sedated with the medications listed above. The patient was monitored continuously with ECG tracing, pulse oximetry, blood pressure monitoring, and direct observations. A rectal examination was performed. The [id number] was inserted into the rectum and advanced under direct vision to the cecum, which was identified by the ileocecal valve and appendiceal orifice. The quality of the colonic preparation was good. A careful inspection was made as the colonoscope was withdrawn, including a retroflexed view of the rectum; findings and interventions are described below. Appropriate photodocumentation was obtained. Findings:  Rectum: normal  Sigmoid: moderate diverticulosis; Descending Colon: moderate diverticulosis;  Transverse Colon: mild diverticulosis; Ascending Colon: normal  Cecum: normal      Specimens:    none    EBL: None    Complications: None; patient tolerated the procedure well. Recommendations:     - High fiber diet.   No follow up colonoscopy needed due to age.       Signed By: Barry Wharton MD                        March 18, 2022

## 2022-03-18 NOTE — H&P
Colonoscopy History and Physical      The patient was seen and examined. Date of last colonoscopy: 2011, Polyps  No      The airway was assessed and documented. The problem list, past medical history, and medications were reviewed. Patient Active Problem List   Diagnosis Code    Post herpetic neuralgia B02.29    Allergic rhinitis 80    Depression F32. A    Anxiety F41.9    Ankle sprain S93.409A    Fibromyalgia M79.7    Dermatitis L30.9    Plantar fasciitis M72.2    CTS (carpal tunnel syndrome) G56.00    Insomnia G47.00    HTN (hypertension) I10    Alzheimer's disease, unspecified G30.9     Social History     Socioeconomic History    Marital status:      Spouse name: Not on file    Number of children: Not on file    Years of education: Not on file    Highest education level: Not on file   Occupational History    Not on file   Tobacco Use    Smoking status: Former Smoker    Smokeless tobacco: Never Used   Vaping Use    Vaping Use: Never used   Substance and Sexual Activity    Alcohol use: Yes     Comment: rare     Drug use: No    Sexual activity: Not on file   Other Topics Concern    Not on file   Social History Narrative    Not on file     Social Determinants of Health     Financial Resource Strain:     Difficulty of Paying Living Expenses: Not on file   Food Insecurity:     Worried About Running Out of Food in the Last Year: Not on file    Martin of Food in the Last Year: Not on file   Transportation Needs:     Lack of Transportation (Medical): Not on file    Lack of Transportation (Non-Medical):  Not on file   Physical Activity:     Days of Exercise per Week: Not on file    Minutes of Exercise per Session: Not on file   Stress:     Feeling of Stress : Not on file   Social Connections:     Frequency of Communication with Friends and Family: Not on file    Frequency of Social Gatherings with Friends and Family: Not on file    Attends Christian Services: Not on file   Pamela Burgos Active Member of Clubs or Organizations: Not on file    Attends Club or Organization Meetings: Not on file    Marital Status: Not on file   Intimate Partner Violence:     Fear of Current or Ex-Partner: Not on file    Emotionally Abused: Not on file    Physically Abused: Not on file    Sexually Abused: Not on file   Housing Stability:     Unable to Pay for Housing in the Last Year: Not on file    Number of [place] in the Last Year: Not on file    Unstable Housing in the Last Year: Not on file     Past Medical History:   Diagnosis Date    Allergic rhinitis     RENEE positive     1:160 speckled.  Angioedema of lips     childhood -unclear trigger     Asthma     CTS (carpal tunnel syndrome)     CTS (carpal tunnel syndrome)     emg, dr. Boubacar Jacobs'    Degenerative cervical disc     Depression     DeQuervain's disease (tenosynovitis)     left    Fibromyalgia     Fibromyalgia     Insomnia 2/15/2013    Post herpetic neuralgia     Rotator cuff tendinitis     left    Seborrheic dermatitis     Thoracic outlet syndrome     Thromboembolus ([address]     right arm          Prior to Admission Medications   Prescriptions Last Dose Informant Patient Reported? Taking? Calcium Carbonate-Vit D3-Min 600 mg calcium- 400 unit tab 3/17/2022 at Unknown time  Yes Yes   Si,000 mg. OTHER 3/17/2022 at Unknown time  Yes Yes   Sig: Stress Tab, Calcium /D, Magnesium , b12  Daily   albuterol (Ventolin HFA) 90 mcg/actuation inhaler Not Taking at Unknown time  Yes No   Patient not taking: Reported on 3/18/2022   alfuzosin SR (UROXATRAL) 10 mg SR tablet 3/17/2022 at Unknown time  Yes Yes   Sig: Take  by mouth daily. cyanocobalamin 1,000 mcg tablet 3/17/2022 at Unknown time  Yes Yes   Si,000 mcg. cyclobenzaprine (FLEXERIL) 10 mg tablet Unknown at Unknown time  No No   Sig: Take 1 Tab by mouth three (3) times daily as needed for Muscle Spasm(s).    gabapentin (NEURONTIN) 100 mg capsule 3/17/2022 at Unknown time Yes Yes   irbesartan (AVAPRO) 150 mg tablet 3/18/2022 at Unknown time  No Yes   Sig: TAKE ONE TABLET BY MOUTH DAILY   levalbuterol tartrate (XOPENEX) 45 mcg/actuation inhaler 3/17/2022 at Unknown time  No Yes   Sig: INHALE 2 PUFFS EVERY 4 TO 6 HOURS AS NEEDED FOR COUGH AND WHEEZE   meloxicam (MOBIC) 7.5 mg tablet 3/17/2022 at Unknown time  No Yes   Sig: TAKE TWO TABLETS BY MOUTH DAILY   methocarbamoL (ROBAXIN) 750 mg tablet 3/11/2022 at Unknown time  Yes Yes   Si mg.   montelukast (SINGULAIR) 10 mg tablet 3/17/2022 at Unknown time  No Yes   Sig: TAKE ONE TABLET BY MOUTH DAILY      Facility-Administered Medications: None       The patient was seen and examined in the endoscopy suite. The airway was assessed and documented. The problem list and medications were reviewed. Chief complaint, history of present illness, and review of systems and Past medical History are positive for: change in bowel habits and dementia    The heart, lungs and mental status were satisfactory for the administration of sedation and for the procedure. I discussed with the patient the objectives, risks, consequences and alternatives to the procedure. The patient was counseled at length about the risks of sumi Covid-19 in the jenn-operative and post-operative states including the recovery window of their procedure. The patient was made aware that sumi Covid-19 after a surgical procedure may worsen their prognosis for recovering from the virus and lend to a higher morbidity and or mortality risk. The patient was given the options of postponing their procedure. All of the risks, benefits, and alternatives were discussed. The patient does  wish to proceed with the procedure.     Plan: Endoscopic procedure with sedation     Angelica Brizuela MD   3/18/2022  3:41 PM

## 2022-03-18 NOTE — ANESTHESIA PREPROCEDURE EVALUATION
Relevant Problems   No relevant active problems       Anesthetic History   No history of anesthetic complications            Review of Systems / Medical History  Patient summary reviewed, nursing notes reviewed and pertinent labs reviewed    Pulmonary            Asthma        Neuro/Psych         Psychiatric history and dementia (alzheimers)     Cardiovascular    Hypertension                   GI/Hepatic/Renal                Endo/Other        Arthritis     Other Findings              Physical Exam    Airway  Mallampati: III  TM Distance: 4 - 6 cm  Neck ROM: normal range of motion   Mouth opening: Normal     Cardiovascular  Regular rate and rhythm,  S1 and S2 normal,  no murmur, click, rub, or gallop             Dental  No notable dental hx       Pulmonary  Breath sounds clear to auscultation               Abdominal  GI exam deferred       Other Findings            Anesthetic Plan    ASA: 3  Anesthesia type: MAC          Induction: Intravenous  Anesthetic plan and risks discussed with: Patient

## 2022-03-18 NOTE — DISCHARGE INSTRUCTIONS
Roxana Mercedes  [id number]  1955    COLON DISCHARGE INSTRUCTIONS  Discomfort:  Redness at IV site- apply warm compress to area; if redness or soreness persist- contact your physician  There may be a slight amount of blood passed from the rectum  Gaseous discomfort- walking, belching will help relieve any discomfort    DIET:   High fiber diet. - however -  remember your colon is empty and a heavy meal will produce gas. Avoid these foods:  vegetables, fried / greasy foods, carbonated drinks for today. You may not drink alcoholic beverages for at least 12 hours    MEDICATIONS:   Regarding Aspirin or Nonsteroidal medications, please see below. ACTIVITY:  You may resume your normal daily activities it is recommended that you spend the remainder of the day resting -  avoid any strenuous activity. You may not operate a vehicle for 12 hours  You may not engage in an occupation involving machinery or appliances for rest of today  Avoid making any critical decisions for at least 24 hour    CALL M.D. ANY SIGN OF:  Increasing pain, nausea, vomiting  Abdominal distension (swelling)  New increased bleeding (oral or rectal)  Fever (chills)  Pain in chest area  Bloody discharge from nose or mouth  Shortness of breath    You may  take any Advil, Aspirin, Ibuprofen, Motrin, Aleve, or  Tylenol as needed for pain. Post procedure diagnosis: 1)Diverticulosis      Follow-up Instructions:   Call Dr. Lupe Deal  Results of procedure / biopsy in 10 days if biopsies were done  Telephone #  [phone]  Patient Education   Patient Education        High-Fiber Diet: Care Instructions  Overview     A high-fiber diet may help you relieve constipation and feel less bloated. Your doctor and dietitian will help you make a high-fiber eating plan based on your personal needs. The plan will include the things you like to eat. It will also make sure that you get 25 to 35 grams of fiber a day.   Before you make changes to the way you eat, be sure to talk with your doctor or dietitian. Follow-up care is a key part of your treatment and safety. Be sure to make and go to all appointments, and call your doctor if you are having problems. It's also a good idea to know your test results and keep a list of the medicines you take. How can you care for yourself at home? · You can increase how much fiber you get if you eat more of certain foods. These foods include:  ? Whole-grain breads and cereals. ? Fruits, such as pears, apples, and peaches. Eat the skins and peels if you can.  ? Vegetables, such as broccoli, cabbage, spinach, carrots, asparagus, and squash. ? Starchy vegetables. These include potatoes with skins, kidney beans, and lima beans. · Take a fiber supplement every day if your doctor recommends it. Examples are Benefiber, Citrucel, FiberCon, and Metamucil. Ask your doctor how much to take. · Drink plenty of fluids. If you have kidney, heart, or liver disease and have to limit fluids, talk with your doctor before you increase the amount of fluids you drink. Where can you learn more? Go to http://www.gray.com/  Enter P529 in the search box to learn more about \"High-Fiber Diet: Care Instructions. \"  Current as of: September 8, 2021               Content Version: 13.2  © 2006-2022 [employer]. Care instructions adapted under license by [employer] (which disclaims liability or warranty for this information). If you have questions about a medical condition or this instruction, always ask your healthcare professional. [address] any warranty or liability for your use of this information. Diverticulosis: Care Instructions  Your Care Instructions  In diverticulosis, pouches called diverticula form in the wall of the large intestine (colon). The pouches do not cause any pain or other symptoms. Most people who have diverticulosis do not know they have it.  But the pouches sometimes bleed, and if they become infected, they can cause pain and other symptoms. When this happens, it is called diverticulitis. Diverticula form when pressure pushes the wall of the colon outward at certain weak points. A diet that is too low in fiber can cause diverticula. Follow-up care is a key part of your treatment and safety. Be sure to make and go to all appointments, and call your doctor if you are having problems. It's also a good idea to know your test results and keep a list of the medicines you take. How can you care for yourself at home? · Include fruits, leafy green vegetables, beans, and whole grains in your diet each day. These foods are high in fiber. · Take a fiber supplement, such as Citrucel or Metamucil, every day if needed. Read and follow all instructions on the label. · Drink plenty of fluids. If you have kidney, heart, or liver disease and have to limit fluids, talk with your doctor before you increase the amount of fluids you drink. · Get at least 30 minutes of exercise on most days of the week. Walking is a good choice. You also may want to do other activities, such as running, swimming, cycling, or playing tennis or team sports. · Cut out foods that cause gas, pain, or other symptoms. When should you call for help? Call your doctor now or seek immediate medical care if:    · You have belly pain.     · You pass maroon or very bloody stools.     · You have a fever.     · You have nausea and vomiting.     · You have unusual changes in your bowel movements or abdominal swelling.     · You have burning pain when you urinate.     · You have abnormal vaginal discharge.     · You have shoulder pain.     · You have cramping pain that does not get better when you have a bowel movement or pass gas.     · You pass gas or stool from your urethra while urinating. Watch closely for changes in your health, and be sure to contact your doctor if you have any problems.   Where can you learn more? Go to http://www.gray.com/  Enter [id number] in the search box to learn more about \"Diverticulosis: Care Instructions. \"  Current as of: September 8, 2021               Content Version: 13.2  © 2006-2022 [employer]. Care instructions adapted under license by [employer] (which disclaims liability or warranty for this information). If you have questions about a medical condition or this instruction, always ask your healthcare professional. [address] any warranty or liability for your use of this information. DISCHARGE SUMMARY from Nurse    The following personal items collected during your admission are returned to you:   Dental Appliance: Dental Appliances: None  Vision: Visual Aid: Glasses  Hearing Aid:    Jewelry:    Clothing:    Other Valuables:    Valuables sent to safe:      Learning About Coronavirus (COVID-19)  Coronavirus (COVID-19): Overview  What is coronavirus (GLBWW-24)? The coronavirus disease (COVID-19) is caused by a virus. It is an illness that was first found in [place], [place], in December 2019. It has since spread worldwide. The virus can cause fever, cough, and trouble breathing. In severe cases, it can cause pneumonia and make it hard to breathe without help. It can cause death. Coronaviruses are a large group of viruses. They cause the common cold. They also cause more serious illnesses like Middle East respiratory syndrome (MERS) and severe acute respiratory syndrome (SARS). COVID-19 is caused by a novel coronavirus. That means it's a new type that has not been seen in people before. This virus spreads person-to-person through droplets from coughing and sneezing. It can also spread when you are close to someone who is infected. And it can spread when you touch something that has the virus on it, such as a doorknob or a tabletop.   What can you do to protect yourself from coronavirus (COVID-19)? The best way to protect yourself from getting sick is to:  · Avoid areas where there is an outbreak. · Avoid contact with people who may be infected. · Wash your hands often with soap or alcohol-based hand sanitizers. · Avoid crowds and try to stay at least 6 feet away from other people. · Wash your hands often, especially after you cough or sneeze. Use soap and water, and scrub for at least 20 seconds. If soap and water aren't available, use an alcohol-based hand . · Avoid touching your mouth, nose, and eyes. What can you do to avoid spreading the virus to others? To help avoid spreading the virus to others:  · Cover your mouth with a tissue when you cough or sneeze. Then throw the tissue in the trash. · Use a disinfectant to clean things that you touch often. · Stay home if you are sick or have been exposed to the virus. Don't go to school, work, or public areas. And don't use public transportation. · If you are sick:  ? Leave your home only if you need to get medical care. But call the doctor's office first so they know you're coming. And wear a face mask, if you have one.  ? If you have a face mask, wear it whenever you're around other people. It can help stop the spread of the virus when you cough or sneeze. ? Clean and disinfect your home every day. Use household  and disinfectant wipes or sprays. Take special care to clean things that you grab with your hands. These include doorknobs, remote controls, phones, and handles on your refrigerator and microwave. And don't forget countertops, tabletops, bathrooms, and computer keyboards. When to call for help  Call 911 anytime you think you may need emergency care. For example, call if:  · You have severe trouble breathing. (You can't talk at all.)  · You have constant chest pain or pressure. · You are severely dizzy or lightheaded. · You are confused or can't think clearly. · Your face and lips have a blue color.   · You pass out (lose consciousness) or are very hard to wake up. Call your doctor now if you develop symptoms such as:  · Shortness of breath. · Fever. · Cough. If you need to get care, call ahead to the doctor's office for instructions before you go. Make sure you wear a face mask, if you have one, to prevent exposing other people to the virus. Where can you get the latest information? The following health organizations are tracking and studying this virus. Their websites contain the most up-to-date information. Clara Hammond also learn what to do if you think you may have been exposed to the virus. · U.S. Centers for Disease Control and Prevention (CDC): The CDC provides updated news about the disease and travel advice. The website also tells you how to prevent the spread of infection. www.cdc.gov  · World Health Organization [facility]): WHO offers information about the virus outbreaks. WHO also has travel advice. www.who.int  Current as of: April 1, 2020               Content Version: 12.4  © 2006-2020 [employer]. Care instructions adapted under license by your healthcare professional. If you have questions about a medical condition or this instruction, always ask your healthcare professional. [address] any warranty or liability for your use of this information.

## 2022-03-19 PROBLEM — G30.9 ALZHEIMER'S DISEASE, UNSPECIFIED (CODE) (HCC): Status: ACTIVE | Noted: 2021-11-24

## 2022-03-19 NOTE — ANESTHESIA POSTPROCEDURE EVALUATION
Post-Anesthesia Evaluation and Assessment    Patient: Carol Aguiar MRN: [id number]  SSN: [id number]    YOB: 1955  Age: 74 y.o. Sex: male      I have evaluated the patient and they are stable and ready for discharge from the PACU. Cardiovascular Function/Vital Signs  Visit Vitals  /80   Pulse (!) 56   Temp 36.6 °C (97.8 °F)   Resp 17   Ht 5' 10\" (1.778 m)   Wt 88 kg (194 lb)   SpO2 95%   BMI 27.84 kg/m²       Patient is status post MAC anesthesia for Procedure(s):  COLONOSCOPY   :-.    Nausea/Vomiting: None    Postoperative hydration reviewed and adequate. Pain:  Pain Scale 1: Numeric (0 - 10) (03/18/22 1625)  Pain Intensity 1: 0 (03/18/22 1625)   Managed    Neurological Status: At baseline    Mental Status, Level of Consciousness: Alert and  oriented to person, place, and time    Pulmonary Status:   O2 Device: None (Room air) (03/18/22 1625)   Adequate oxygenation and airway patent    Complications related to anesthesia: None    Post-anesthesia assessment completed. No concerns    Signed By: Swati Maldonado MD     March 19, 2022              Procedure(s):  COLONOSCOPY   :-.    MAC    <BSHSIANPOST>    INITIAL Post-op Vital signs:   Vitals Value Taken Time   BP 78/34 03/18/22 1652   Temp 36.6 °C (97.8 °F) 03/18/22 1611   Pulse 55 03/18/22 1627   Resp 17 03/18/22 1627   SpO2 95 % 03/18/22 1652   Vitals shown include unvalidated device data.

## 2022-03-23 RX ORDER — MEMANTINE HYDROCHLORIDE 5 MG/1
TABLET ORAL
Qty: 60 TABLET | Refills: 3 | Status: SHIPPED | OUTPATIENT
Start: 2022-03-23 | End: 2022-05-09 | Stop reason: SDUPTHER

## 2022-05-09 ENCOUNTER — TELEPHONE (OUTPATIENT)
Dept: NEUROLOGY | Age: 67
End: 2022-05-09

## 2022-05-09 RX ORDER — MEMANTINE HYDROCHLORIDE 10 MG/1
10 TABLET ORAL 2 TIMES DAILY
Qty: 60 TABLET | Refills: 0 | Status: SHIPPED | OUTPATIENT
Start: 2022-05-09 | End: 2022-06-08

## 2022-05-09 NOTE — TELEPHONE ENCOUNTER
Patient would like Memantine increased to 10mg BID. Please send to local pharmacy, if appropriate.  Thanks

## 2022-07-11 ENCOUNTER — TELEPHONE (OUTPATIENT)
Dept: NEUROLOGY | Age: 67
End: 2022-07-11

## 2022-07-11 RX ORDER — MEMANTINE HYDROCHLORIDE 10 MG/1
10 TABLET ORAL 2 TIMES DAILY
Qty: 60 TABLET | Refills: 3 | Status: SHIPPED | OUTPATIENT
Start: 2022-07-11 | End: 2022-09-06

## 2022-07-11 NOTE — TELEPHONE ENCOUNTER
Pt has questions about his medication. He states he is not being given refills and isn't sure if he should continue use of medication. Please call.

## 2022-09-06 ENCOUNTER — OFFICE VISIT (OUTPATIENT)
Dept: NEUROLOGY | Age: 67
End: 2022-09-06
Payer: MEDICARE

## 2022-09-06 VITALS
BODY MASS INDEX: 27.2 KG/M2 | DIASTOLIC BLOOD PRESSURE: 62 MMHG | HEIGHT: 70 IN | WEIGHT: 190 LBS | RESPIRATION RATE: 18 BRPM | OXYGEN SATURATION: 96 % | SYSTOLIC BLOOD PRESSURE: 128 MMHG | HEART RATE: 63 BPM

## 2022-09-06 DIAGNOSIS — F03.90 DEMENTIA WITHOUT BEHAVIORAL DISTURBANCE, UNSPECIFIED DEMENTIA TYPE: Primary | ICD-10-CM

## 2022-09-06 PROCEDURE — G8754 DIAS BP LESS 90: HCPCS | Performed by: PSYCHIATRY & NEUROLOGY

## 2022-09-06 PROCEDURE — 99213 OFFICE O/P EST LOW 20 MIN: CPT | Performed by: PSYCHIATRY & NEUROLOGY

## 2022-09-06 PROCEDURE — G8752 SYS BP LESS 140: HCPCS | Performed by: PSYCHIATRY & NEUROLOGY

## 2022-09-06 PROCEDURE — G9717 DOC PT DX DEP/BP F/U NT REQ: HCPCS | Performed by: PSYCHIATRY & NEUROLOGY

## 2022-09-06 PROCEDURE — G8427 DOCREV CUR MEDS BY ELIG CLIN: HCPCS | Performed by: PSYCHIATRY & NEUROLOGY

## 2022-09-06 PROCEDURE — 1101F PT FALLS ASSESS-DOCD LE1/YR: CPT | Performed by: PSYCHIATRY & NEUROLOGY

## 2022-09-06 PROCEDURE — 1123F ACP DISCUSS/DSCN MKR DOCD: CPT | Performed by: PSYCHIATRY & NEUROLOGY

## 2022-09-06 PROCEDURE — G8536 NO DOC ELDER MAL SCRN: HCPCS | Performed by: PSYCHIATRY & NEUROLOGY

## 2022-09-06 PROCEDURE — G8419 CALC BMI OUT NRM PARAM NOF/U: HCPCS | Performed by: PSYCHIATRY & NEUROLOGY

## 2022-09-06 PROCEDURE — 3017F COLORECTAL CA SCREEN DOC REV: CPT | Performed by: PSYCHIATRY & NEUROLOGY

## 2022-09-06 RX ORDER — MEMANTINE HYDROCHLORIDE 10 MG/1
10 TABLET ORAL 2 TIMES DAILY
Qty: 60 TABLET | Refills: 3 | Status: SHIPPED | OUTPATIENT
Start: 2022-09-06

## 2022-09-06 NOTE — PROGRESS NOTES
Neurology Clinic Follow up Note    Patient ID:  Jared Wright  [id number]  80 y.o.  1955      Mr. Feli Castle is here for follow up today of  No chief complaint on file. Last Appointment With Me:  5/18/2022       Interval History:     Interval from prior evaluation patient unfortunately lost his house. Has been living in a hotel for some period of time, is working with insurance to return to normal housing. Member is overall stable. PMHx/ PSHx/ FHx/ SHx:  Reviewed and unchanged previous visit. ROS:  Comprehensive review of systems negative except for as noted above. Objective:       Meds:  Current Outpatient Medications   Medication Sig Dispense Refill    memantine (Namenda) 10 mg tablet Take 1 Tablet by mouth two (2) times a day. 60 Tablet 3    montelukast (SINGULAIR) 10 mg tablet TAKE ONE TABLET BY MOUTH DAILY 90 Tablet 2    gabapentin (NEURONTIN) 100 mg capsule       cyanocobalamin 1,000 mcg tablet 1,000 mcg. Calcium Carbonate-Vit D3-Min 600 mg calcium- 400 unit tab 1,000 mg.      meloxicam (MOBIC) 7.5 mg tablet TAKE TWO TABLETS BY MOUTH DAILY 180 Tablet 0    irbesartan (AVAPRO) 150 mg tablet TAKE ONE TABLET BY MOUTH DAILY 90 Tablet 0    levalbuterol tartrate (XOPENEX) 45 mcg/actuation inhaler INHALE 2 PUFFS EVERY 4 TO 6 HOURS AS NEEDED FOR COUGH AND WHEEZE 15 Inhaler 3    OTHER Stress Tab, Calcium /D, Magnesium , b12  Daily      alfuzosin SR (UROXATRAL) 10 mg SR tablet Take  by mouth daily. cyclobenzaprine (FLEXERIL) 10 mg tablet Take 1 Tab by mouth three (3) times daily as needed for Muscle Spasm(s). 30 Tab 1    methocarbamoL (ROBAXIN) 750 mg tablet 750 mg.  (Patient not taking: Reported on 9/6/2022)      albuterol (PROVENTIL HFA, VENTOLIN HFA, PROAIR HFA) 90 mcg/actuation inhaler  (Patient not taking: Reported on 9/6/2022)         Exam:  Visit Vitals  /62 (BP 1 Location: Left upper arm, BP Patient Position: Sitting, BP Cuff Size: Adult)   Pulse 63   Resp 18   Ht 5' 10\" (1.778 m)   Wt 190 lb (86.2 kg)   SpO2 96%   BMI 27.26 kg/m²     Elderly male resting underbelly in exam room in no clear distress. HEENT appears grossly unrevealing to observation neck appears supple. Cardiopulmonary exams are unrevealing to observation. Abdomen is nondistended. Extremities appear warm/dry. Neurologically patient appears oriented to self and situation orientation to day month year not tested. Attention is impaired to casual conversation. Cranials 2 through 12 appear grossly intact observation. Motorically patient moves all extremities equally in upper and lower extremities. Primary gait and station is unremarkable. LABS  Results for orders placed or performed during the hospital encounter of 03/14/22   SARS-COV-2   Result Value Ref Range    Specimen source Nasopharyngeal      SARS-CoV-2 Not detected NOTD         IMAGING:  MRI Results (most recent):  Results from [place] encounter on 11/02/21    MRI LUMB SPINE WO CONT    Narrative  EXAM: MRI LUMB SPINE WO CONT    INDICATION: . Other intervertebral disc degeneration, lumbar region    COMPARISON: MR lumbar spine September 17, 2019    TECHNIQUE: MR imaging of the lumbar spine was performed using the following  sequences: sagittal T1, T2, STIR;  axial T1, T2.    CONTRAST:  None. FINDINGS:    There is normal alignment of the lumbar spine. Vertebral body heights are  maintained. Marrow signal is normal.    The conus medullaris terminates at L1. Signal and caliber of the distal spinal  cord are within normal limits. The paraspinal soft tissues are within normal limits. Lower thoracic spine: No herniation or stenosis. L1-L2: Broad-based disc bulge with superimposed right paracentral/foraminal disc  protrusion which causes narrowing of the right lateral recess and impingement  upon the traversing right L2 nerve root. Mild bilateral neuroforaminal  narrowing.     L2-L3: Broad-based disc bulge and mild bilateral facet arthropathy cause mild  spinal canal narrowing and mild bilateral neuroforaminal narrowing. L3-L4: Broad-based disc bulge, and moderate bilateral facet arthropathy with  mild thickening of ligamentum flavum causes mild spinal canal narrowing and  moderate bilateral neuroforaminal narrowing. L4-L5: Broad-based disc bulge with superimposed central disc protrusion, with  moderate bilateral facet arthropathy, causes mild spinal canal narrowing and  severe bilateral neuroforaminal narrowing. L5-S1: Broad-based disc bulge and moderate bilateral facet arthropathy cause  severe bilateral neuroforaminal narrowing. No significant spinal canal  narrowing. Impression  1. Severe bilateral neuroforaminal narrowing at L4-5 and L5-S1. Multiple levels  of mild to moderate neuroforaminal narrowing as detailed above. 2. Mild spinal canal narrowing at L2-3, L3-4, and L4-5.  3. Right paracentral/foraminal disc protrusion at L1-2 cause narrowing of the  right lateral recess and impingement upon the traversing right L2 nerve root.       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

## 2023-05-24 RX ORDER — MONTELUKAST SODIUM 10 MG/1
1 TABLET ORAL DAILY
COMMUNITY
Start: 2022-03-10

## 2023-05-24 RX ORDER — LEVALBUTEROL TARTRATE 45 UG/1
AEROSOL, METERED ORAL
COMMUNITY
Start: 2018-11-10

## 2023-05-24 RX ORDER — ALFUZOSIN HYDROCHLORIDE 10 MG/1
TABLET, EXTENDED RELEASE ORAL DAILY
COMMUNITY

## 2023-05-24 RX ORDER — MEMANTINE HYDROCHLORIDE 10 MG/1
10 TABLET ORAL 2 TIMES DAILY
COMMUNITY
Start: 2022-09-06

## 2023-05-24 RX ORDER — IRBESARTAN 150 MG/1
1 TABLET ORAL DAILY
COMMUNITY
Start: 2021-07-02

## 2023-05-24 RX ORDER — METHOCARBAMOL 750 MG/1
750 TABLET, FILM COATED ORAL
COMMUNITY

## 2023-05-24 RX ORDER — GABAPENTIN 100 MG/1
CAPSULE ORAL
COMMUNITY
Start: 2021-11-17

## 2023-05-24 RX ORDER — ALBUTEROL SULFATE 90 UG/1
AEROSOL, METERED RESPIRATORY (INHALATION)
COMMUNITY
Start: 2021-07-01

## 2023-05-24 RX ORDER — CYCLOBENZAPRINE HCL 10 MG
10 TABLET ORAL 3 TIMES DAILY PRN
COMMUNITY
Start: 2017-02-07

## 2023-05-24 RX ORDER — MELOXICAM 7.5 MG/1
2 TABLET ORAL DAILY
COMMUNITY
Start: 2021-09-12

## 2024-10-22 ENCOUNTER — TELEPHONE (OUTPATIENT)
Age: 69
End: 2024-10-22

## 2024-10-22 NOTE — TELEPHONE ENCOUNTER
Patient is returning a call from our office.    PSR does not see any record where anyone called the patient.    Please call back if necessary.

## 2024-11-06 ENCOUNTER — OFFICE VISIT (OUTPATIENT)
Age: 69
End: 2024-11-06

## 2024-11-06 VITALS
BODY MASS INDEX: 27.2 KG/M2 | HEART RATE: 61 BPM | SYSTOLIC BLOOD PRESSURE: 124 MMHG | WEIGHT: 190 LBS | RESPIRATION RATE: 18 BRPM | HEIGHT: 70 IN | OXYGEN SATURATION: 98 % | DIASTOLIC BLOOD PRESSURE: 68 MMHG

## 2024-11-06 DIAGNOSIS — R29.6 FALLS FREQUENTLY: ICD-10-CM

## 2024-11-06 DIAGNOSIS — G89.29 CHRONIC BILATERAL LOW BACK PAIN, UNSPECIFIED WHETHER SCIATICA PRESENT: ICD-10-CM

## 2024-11-06 DIAGNOSIS — F02.C3 SEVERE EARLY ONSET ALZHEIMER'S DEMENTIA WITH MOOD DISTURBANCE (HCC): Primary | ICD-10-CM

## 2024-11-06 DIAGNOSIS — G30.0 SEVERE EARLY ONSET ALZHEIMER'S DEMENTIA WITH MOOD DISTURBANCE (HCC): Primary | ICD-10-CM

## 2024-11-06 DIAGNOSIS — M54.50 CHRONIC BILATERAL LOW BACK PAIN, UNSPECIFIED WHETHER SCIATICA PRESENT: ICD-10-CM

## 2024-11-06 DIAGNOSIS — Z71.89 ADVANCED CARE PLANNING/COUNSELING DISCUSSION: ICD-10-CM

## 2024-11-06 DIAGNOSIS — F51.04 CHRONIC INSOMNIA: ICD-10-CM

## 2024-11-06 RX ORDER — TRAZODONE HYDROCHLORIDE 50 MG/1
50 TABLET, FILM COATED ORAL NIGHTLY
Qty: 90 TABLET | Refills: 1 | Status: SHIPPED | OUTPATIENT
Start: 2024-11-06

## 2024-11-06 RX ORDER — MEMANTINE HYDROCHLORIDE 10 MG/1
10 TABLET ORAL 2 TIMES DAILY
Qty: 180 TABLET | Refills: 1 | Status: SHIPPED | OUTPATIENT
Start: 2024-11-06

## 2024-11-06 RX ORDER — DICYCLOMINE HYDROCHLORIDE 10 MG/1
10 CAPSULE ORAL 3 TIMES DAILY
COMMUNITY

## 2024-11-06 ASSESSMENT — PATIENT HEALTH QUESTIONNAIRE - PHQ9
SUM OF ALL RESPONSES TO PHQ QUESTIONS 1-9: 0
SUM OF ALL RESPONSES TO PHQ9 QUESTIONS 1 & 2: 0
2. FEELING DOWN, DEPRESSED OR HOPELESS: NOT AT ALL
SUM OF ALL RESPONSES TO PHQ QUESTIONS 1-9: 0
1. LITTLE INTEREST OR PLEASURE IN DOING THINGS: NOT AT ALL

## 2024-11-06 NOTE — PROGRESS NOTES
Chief Complaint   Patient presents with    Follow-up    Memory Loss      Vitals:    11/06/24 1408   BP: 124/68   Pulse: 61   Resp: 18   SpO2: 98%      
behavioral problems and hallucinations. The patient is nervous/anxious.          Past Medical History:   Diagnosis Date    Allergic rhinitis     JUAN positive 2004    1:160 speckled.    Angioedema of lips     childhood -unclear trigger     Asthma     CTS (carpal tunnel syndrome)     CTS (carpal tunnel syndrome) 2004    emg, dr. Sue'    Degenerative cervical disc     Depression     Fibromyalgia     Fibromyalgia     Insomnia 2/15/2013    Post herpetic neuralgia     Rotator cuff tendinitis     left    Seborrheic dermatitis     Thoracic outlet syndrome     Thromboembolus (HCC)     right arm      Family History   Problem Relation Age of Onset    Hypertension Brother     Heart Disease Sister     Diabetes Mother     Hypertension Sister     Osteoarthritis Mother     Heart Disease Father         MI    Hypertension Father     Heart Disease Mother         MI    Hypertension Mother      Social History     Socioeconomic History    Marital status:      Spouse name: Not on file    Number of children: Not on file    Years of education: Not on file    Highest education level: Not on file   Occupational History    Not on file   Tobacco Use    Smoking status: Former    Smokeless tobacco: Never   Vaping Use    Vaping status: Never Used   Substance and Sexual Activity    Alcohol use: Yes    Drug use: No    Sexual activity: Not on file   Other Topics Concern    Not on file   Social History Narrative    Not on file     Social Determinants of Health     Financial Resource Strain: Not on file   Food Insecurity: Not on file   Transportation Needs: Not on file   Physical Activity: Not on file   Stress: Not on file   Social Connections: Not on file   Intimate Partner Violence: Not on file   Housing Stability: Not on file     Allergies   Allergen Reactions    Latex      Other reaction(s): Unknown (comments)    Albuterol Other (See Comments)     Palpitations, insomnia . (takes Xopenex Ok )    Egg Solids, Whole      Other reaction(s):

## 2024-11-07 ASSESSMENT — ENCOUNTER SYMPTOMS
BACK PAIN: 1
PHOTOPHOBIA: 0

## 2025-05-05 NOTE — PROGRESS NOTES
Chief Complaint   Patient presents with    Follow-up    Memory Loss     Dementia.  Patient reports tremors in bilateral feet. Memory getting worse.  Trouble sleeping       HPI    Mr Wolf is a 69 year old male here for FU. Seen last on 11/6/24 for severe early onset AD with insomnia and falls. Last MOCA 13/30. He is on Namenda 10 mg BID with Trazodone at night. I referred him for a MRI brain and Neuropsych testing, neither were completed. He is here today with her partner. They are reporting that he has progressed since last visit. He is having some falls. There is a lot of gait instability and tremors in his hands and feet. He's not sleeping well, despite the Trazodone. He will wake up in the middle of the night. His mood is good, no aggression. Some minor hallucinations. Eating and drinking well. He remains fairly independent with assistance. Trouble walking. Having to use the wheelchair more. He is in PT now for his back.          Last visit  Mr Wolf is a 69 year old male here for FU. He is a new patient for me this visit. He was last seen 2 years ago by Dr Marie for memory loss. He is on Namenda 10 mg BID. He is here today with his partner. They reports that his memory has gotten worse. Short term is really bad for him. He struggles with time management and processing things that he is told to do. He suffers from chronic insomnia, and has for years. He uses Trazadone to help him sleep. Partner reports that he does have lots of anxiety and OCD behaviors with some hording tendencies. He things this keep him up at night also. He has chronic spinal stenosis and is treated with gabapentin with ortho. He does fall. Partner is worried about the stairs. He did PT a while ago with little benefit. Reporting he is still driving but very short distances. Needs help with all his medications and his partner handles all the finances. Tolerating the Namenda without any side effects.     Review of Systems

## 2025-05-06 ENCOUNTER — OFFICE VISIT (OUTPATIENT)
Age: 70
End: 2025-05-06
Payer: MEDICARE

## 2025-05-06 VITALS
HEIGHT: 70 IN | RESPIRATION RATE: 18 BRPM | OXYGEN SATURATION: 97 % | HEART RATE: 90 BPM | WEIGHT: 190 LBS | DIASTOLIC BLOOD PRESSURE: 76 MMHG | BODY MASS INDEX: 27.2 KG/M2 | SYSTOLIC BLOOD PRESSURE: 126 MMHG

## 2025-05-06 DIAGNOSIS — F02.C3 SEVERE EARLY ONSET ALZHEIMER'S DEMENTIA WITH MOOD DISTURBANCE (HCC): Primary | ICD-10-CM

## 2025-05-06 DIAGNOSIS — F51.04 CHRONIC INSOMNIA: ICD-10-CM

## 2025-05-06 DIAGNOSIS — F03.92 HALLUCINATIONS DUE TO LATE ONSET DEMENTIA (HCC): ICD-10-CM

## 2025-05-06 DIAGNOSIS — G30.0 SEVERE EARLY ONSET ALZHEIMER'S DEMENTIA WITH MOOD DISTURBANCE (HCC): Primary | ICD-10-CM

## 2025-05-06 DIAGNOSIS — G21.8 OTHER SECONDARY PARKINSONISM (HCC): ICD-10-CM

## 2025-05-06 DIAGNOSIS — R26.81 GAIT INSTABILITY: ICD-10-CM

## 2025-05-06 DIAGNOSIS — G20.C PARKINSONIAN TREMOR (HCC): ICD-10-CM

## 2025-05-06 PROCEDURE — 99215 OFFICE O/P EST HI 40 MIN: CPT

## 2025-05-06 PROCEDURE — 3074F SYST BP LT 130 MM HG: CPT

## 2025-05-06 PROCEDURE — 3078F DIAST BP <80 MM HG: CPT

## 2025-05-06 PROCEDURE — 1126F AMNT PAIN NOTED NONE PRSNT: CPT

## 2025-05-06 PROCEDURE — 1123F ACP DISCUSS/DSCN MKR DOCD: CPT

## 2025-05-06 RX ORDER — MEMANTINE HYDROCHLORIDE 10 MG/1
10 TABLET ORAL 2 TIMES DAILY
Qty: 180 TABLET | Refills: 1 | Status: SHIPPED | OUTPATIENT
Start: 2025-05-06

## 2025-05-06 RX ORDER — CARBIDOPA AND LEVODOPA 25; 100 MG/1; MG/1
1 TABLET ORAL 3 TIMES DAILY
Qty: 270 TABLET | Refills: 1 | Status: SHIPPED | OUTPATIENT
Start: 2025-05-06

## 2025-05-06 RX ORDER — QUETIAPINE FUMARATE 25 MG/1
12.5 TABLET, FILM COATED ORAL NIGHTLY
Qty: 30 TABLET | Refills: 2 | Status: SHIPPED | OUTPATIENT
Start: 2025-05-06

## 2025-05-06 ASSESSMENT — PATIENT HEALTH QUESTIONNAIRE - PHQ9
SUM OF ALL RESPONSES TO PHQ QUESTIONS 1-9: 0
SUM OF ALL RESPONSES TO PHQ QUESTIONS 1-9: 0
1. LITTLE INTEREST OR PLEASURE IN DOING THINGS: NOT AT ALL
2. FEELING DOWN, DEPRESSED OR HOPELESS: NOT AT ALL
SUM OF ALL RESPONSES TO PHQ QUESTIONS 1-9: 0
SUM OF ALL RESPONSES TO PHQ QUESTIONS 1-9: 0

## 2025-08-13 DIAGNOSIS — F02.C3 SEVERE EARLY ONSET ALZHEIMER'S DEMENTIA WITH MOOD DISTURBANCE (HCC): ICD-10-CM

## 2025-08-13 DIAGNOSIS — G30.0 SEVERE EARLY ONSET ALZHEIMER'S DEMENTIA WITH MOOD DISTURBANCE (HCC): ICD-10-CM

## 2025-08-13 RX ORDER — MEMANTINE HYDROCHLORIDE 10 MG/1
10 TABLET ORAL 2 TIMES DAILY
Qty: 180 TABLET | Refills: 1 | Status: SHIPPED | OUTPATIENT
Start: 2025-08-13

## 2025-08-28 ENCOUNTER — OFFICE VISIT (OUTPATIENT)
Age: 70
End: 2025-08-28
Payer: MEDICARE

## 2025-08-28 VITALS
HEART RATE: 68 BPM | WEIGHT: 185 LBS | OXYGEN SATURATION: 98 % | SYSTOLIC BLOOD PRESSURE: 124 MMHG | DIASTOLIC BLOOD PRESSURE: 78 MMHG | HEIGHT: 70 IN | BODY MASS INDEX: 26.48 KG/M2 | RESPIRATION RATE: 17 BRPM

## 2025-08-28 DIAGNOSIS — G21.8 OTHER SECONDARY PARKINSONISM (HCC): ICD-10-CM

## 2025-08-28 DIAGNOSIS — F03.92 HALLUCINATIONS DUE TO LATE ONSET DEMENTIA (HCC): ICD-10-CM

## 2025-08-28 DIAGNOSIS — R26.81 GAIT INSTABILITY: ICD-10-CM

## 2025-08-28 DIAGNOSIS — F02.C3 SEVERE LEWY BODY DEMENTIA WITH MOOD DISTURBANCE (HCC): Primary | ICD-10-CM

## 2025-08-28 DIAGNOSIS — G31.83 SEVERE LEWY BODY DEMENTIA WITH MOOD DISTURBANCE (HCC): Primary | ICD-10-CM

## 2025-08-28 DIAGNOSIS — F51.04 CHRONIC INSOMNIA: ICD-10-CM

## 2025-08-28 PROCEDURE — 1123F ACP DISCUSS/DSCN MKR DOCD: CPT

## 2025-08-28 PROCEDURE — 1125F AMNT PAIN NOTED PAIN PRSNT: CPT

## 2025-08-28 PROCEDURE — 3078F DIAST BP <80 MM HG: CPT

## 2025-08-28 PROCEDURE — 99215 OFFICE O/P EST HI 40 MIN: CPT

## 2025-08-28 PROCEDURE — 1159F MED LIST DOCD IN RCRD: CPT

## 2025-08-28 PROCEDURE — 3074F SYST BP LT 130 MM HG: CPT

## 2025-08-28 RX ORDER — QUETIAPINE FUMARATE 25 MG/1
25 TABLET, FILM COATED ORAL NIGHTLY
Qty: 30 TABLET | Refills: 2 | Status: SHIPPED | OUTPATIENT
Start: 2025-08-28

## 2025-08-28 ASSESSMENT — PATIENT HEALTH QUESTIONNAIRE - PHQ9
2. FEELING DOWN, DEPRESSED OR HOPELESS: NOT AT ALL
SUM OF ALL RESPONSES TO PHQ QUESTIONS 1-9: 0
1. LITTLE INTEREST OR PLEASURE IN DOING THINGS: NOT AT ALL

## 2025-08-28 ASSESSMENT — VISUAL ACUITY: VA_NORMAL: 1
